# Patient Record
Sex: FEMALE | Race: WHITE | NOT HISPANIC OR LATINO | Employment: UNEMPLOYED | URBAN - METROPOLITAN AREA
[De-identification: names, ages, dates, MRNs, and addresses within clinical notes are randomized per-mention and may not be internally consistent; named-entity substitution may affect disease eponyms.]

---

## 2018-06-13 ENCOUNTER — OFFICE VISIT (OUTPATIENT)
Dept: OBSTETRICS AND GYNECOLOGY | Facility: CLINIC | Age: 23
End: 2018-06-13
Payer: COMMERCIAL

## 2018-06-13 VITALS
BODY MASS INDEX: 19.73 KG/M2 | DIASTOLIC BLOOD PRESSURE: 72 MMHG | WEIGHT: 133.19 LBS | HEIGHT: 69 IN | SYSTOLIC BLOOD PRESSURE: 100 MMHG

## 2018-06-13 DIAGNOSIS — Z30.9 ENCOUNTER FOR CONTRACEPTIVE MANAGEMENT, UNSPECIFIED TYPE: ICD-10-CM

## 2018-06-13 DIAGNOSIS — Z11.3 SCREEN FOR STD (SEXUALLY TRANSMITTED DISEASE): ICD-10-CM

## 2018-06-13 DIAGNOSIS — Z01.419 WOMEN'S ANNUAL ROUTINE GYNECOLOGICAL EXAMINATION: Primary | ICD-10-CM

## 2018-06-13 DIAGNOSIS — F32.81 PMDD (PREMENSTRUAL DYSPHORIC DISORDER): ICD-10-CM

## 2018-06-13 LAB
B-HCG UR QL: NEGATIVE
CTP QC/QA: YES

## 2018-06-13 PROCEDURE — 99385 PREV VISIT NEW AGE 18-39: CPT | Mod: S$GLB,,, | Performed by: NURSE PRACTITIONER

## 2018-06-13 PROCEDURE — 87491 CHLMYD TRACH DNA AMP PROBE: CPT

## 2018-06-13 PROCEDURE — 81025 URINE PREGNANCY TEST: CPT | Mod: S$GLB,,, | Performed by: NURSE PRACTITIONER

## 2018-06-13 PROCEDURE — 87510 GARDNER VAG DNA DIR PROBE: CPT

## 2018-06-13 PROCEDURE — 87480 CANDIDA DNA DIR PROBE: CPT

## 2018-06-13 PROCEDURE — 99999 PR PBB SHADOW E&M-NEW PATIENT-LVL III: CPT | Mod: PBBFAC,,, | Performed by: NURSE PRACTITIONER

## 2018-06-13 RX ORDER — DROSPIRENONE AND ETHINYL ESTRADIOL 0.02-3(28)
1 KIT ORAL DAILY
Qty: 90 TABLET | Refills: 3 | Status: SHIPPED | OUTPATIENT
Start: 2018-06-13 | End: 2018-07-26 | Stop reason: SDUPTHER

## 2018-06-13 RX ORDER — SERTRALINE HYDROCHLORIDE 50 MG/1
TABLET, FILM COATED ORAL
COMMUNITY
Start: 2018-04-30 | End: 2023-11-07

## 2018-06-13 RX ORDER — DROSPIRENONE, ETHINYL ESTRADIOL AND LEVOMEFOLATE CALCIUM AND LEVOMEFOLATE CALCIUM 3-0.02(24)
3 KIT ORAL DAILY
Qty: 84 TABLET | Refills: 3 | Status: SHIPPED | OUTPATIENT
Start: 2018-06-13 | End: 2020-06-25

## 2018-06-13 NOTE — PROGRESS NOTES
"CC: Annual  HPI: Pt "SASCHA"  is a 22 y.o.  female who presents for routine annual exam. 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  She uses condoms for contraception- desires to restart OCPs.  She is a non- smoker.  Denies history of HTN, blood clots, or stroke.  She does want STD screening- vaginal cultures.  Pt reports depression the week prior to her cycle- reports the depression resolves with onset of menses. She is s/p the HPV vaccine series.     ROS:  GENERAL: Feeling well overall. Denies fever or chills.   SKIN: Denies rash or lesions.   HEAD: Denies head injury or headache.   NODES: Denies enlarged lymph nodes.   CHEST: Denies chest pain or shortness of breath.   CARDIOVASCULAR: Denies palpitations or left sided chest pain.   ABDOMEN: No abdominal pain, constipation, diarrhea, nausea, vomiting or rectal bleeding.   URINARY: No dysuria, hematuria, or burning on urination.  REPRODUCTIVE: See HPI.   BREASTS: Denies pain, lumps, or nipple discharge.   HEMATOLOGIC: No easy bruisability or excessive bleeding.   MUSCULOSKELETAL: Denies joint pain or swelling.   NEUROLOGIC: Denies syncope or weakness.   PSYCHIATRIC: Denies depression, anxiety or mood swings.    PE:   APPEARANCE: Well nourished, well developed, White female in no acute distress.  NODES: no cervical, supraclavicular, or inguinal lymphadenopathy  BREASTS: Symmetrical, no skin changes or visible lesions. No palpable masses, nipple discharge or adenopathy bilaterally.  ABDOMEN: Soft. No tenderness or masses. No distention. No hernias palpated. No CVA tenderness.  VULVA: No lesions. Normal external female genitalia.  URETHRAL MEATUS: Normal size and location, no lesions, no prolapse.  URETHRA: No masses, tenderness, or prolapse.  VAGINA: Moist. No lesions or lacerations noted. No abnormal discharge present. No odor present.   CERVIX: No lesions or discharge. No cervical motion tenderness.   UTERUS: Normal size, regular shape, mobile, non-tender.  ADNEXA: No " tenderness. No fullness or masses palpated in the adnexal regions.   ANUS PERINEUM: Normal.      Diagnosis:  1. Women's annual routine gynecological examination    2. Encounter for contraceptive management, unspecified type    3. PMDD (premenstrual dysphoric disorder)    4. Screen for STD (sexually transmitted disease)        Plan:   Pap not indicated < 21 y.o.  STD screening  OCPs  Discussed PMDD- can trial continuous dosing OCPs if no improvement in mood with OCPs.   The use of the oral contraceptive has been fully discussed with the patient. This includes the proper method to initiate and continue the pills, the need for regular compliance to ensure adequate contraceptive effect, the physiology which make the pill effective, the instructions for what to do in event of a missed pill, and warnings about anticipated minor side effects such as breakthrough spotting, nausea, breast tenderness, weight changes, acne, headaches, etc.  She has been told of the more serious potential side effects such as MI, stroke, and deep vein thrombosis, all of which are very unlikely.  She has been asked to report any signs of such serious problems immediately.  She should back up the pill with a condom during any cycle in which antibiotics are prescribed, and during the first cycle as well. The need for additional protection, such as a condom, to prevent exposure to sexually transmitted diseases has also been discussed- the patient has been clearly reminded that OCP's cannot protect her against diseases such as HIV and others. She understands and wishes to take the medication as prescribed.       Orders Placed This Encounter    C. trachomatis/N. gonorrhoeae by AMP DNA Cervix    Vaginosis Screen by DNA Probe    POCT Urine Pregnancy    drospirenone-e.estradiol-lm.FA 3-0.02-0.451 mg (24) (4) Tab       Patient was counseled today on the new ACS guidelines for cervical cytology screening as well as the current recommendations for  breast cancer screening. She was counseled to follow up with her PCP for other routine health maintenance. Counseling session lasted approximately 10 minutes, and all her questions were answered.    Follow-up with me in 1 year for routine exam    CHERIE Cedeño

## 2018-06-14 LAB
CANDIDA RRNA VAG QL PROBE: NEGATIVE
G VAGINALIS RRNA GENITAL QL PROBE: NEGATIVE
T VAGINALIS RRNA GENITAL QL PROBE: NEGATIVE

## 2018-06-15 LAB
C TRACH DNA SPEC QL NAA+PROBE: NOT DETECTED
N GONORRHOEA DNA SPEC QL NAA+PROBE: NOT DETECTED

## 2018-07-26 RX ORDER — DROSPIRENONE AND ETHINYL ESTRADIOL TABLETS 0.02-3(28)
KIT ORAL
Qty: 84 TABLET | Refills: 0 | Status: SHIPPED | OUTPATIENT
Start: 2018-07-26 | End: 2018-12-18 | Stop reason: SDUPTHER

## 2018-08-07 ENCOUNTER — EMERGENCY (EMERGENCY)
Facility: HOSPITAL | Age: 23
LOS: 1 days | Discharge: HOME | End: 2018-08-07
Attending: EMERGENCY MEDICINE
Payer: COMMERCIAL

## 2018-08-07 VITALS
SYSTOLIC BLOOD PRESSURE: 103 MMHG | RESPIRATION RATE: 18 BRPM | WEIGHT: 134.92 LBS | DIASTOLIC BLOOD PRESSURE: 57 MMHG | HEART RATE: 85 BPM | TEMPERATURE: 100 F | OXYGEN SATURATION: 97 %

## 2018-08-07 LAB — GAS PNL BLDV: SIGNIFICANT CHANGE UP

## 2018-08-07 PROCEDURE — 71046 X-RAY EXAM CHEST 2 VIEWS: CPT | Mod: 26

## 2018-08-07 PROCEDURE — 99284 EMERGENCY DEPT VISIT MOD MDM: CPT

## 2018-08-07 RX ORDER — SODIUM CHLORIDE 9 MG/ML
1000 INJECTION INTRAMUSCULAR; INTRAVENOUS; SUBCUTANEOUS ONCE
Qty: 0 | Refills: 0 | Status: COMPLETED | OUTPATIENT
Start: 2018-08-07 | End: 2018-08-07

## 2018-08-07 RX ORDER — ACETAMINOPHEN 500 MG
650 TABLET ORAL ONCE
Qty: 0 | Refills: 0 | Status: COMPLETED | OUTPATIENT
Start: 2018-08-07 | End: 2018-08-07

## 2018-08-07 RX ORDER — IBUPROFEN 200 MG
600 TABLET ORAL ONCE
Qty: 0 | Refills: 0 | Status: COMPLETED | OUTPATIENT
Start: 2018-08-07 | End: 2018-08-07

## 2018-08-07 RX ADMIN — Medication 600 MILLIGRAM(S): at 23:31

## 2018-08-07 RX ADMIN — SODIUM CHLORIDE 1000 MILLILITER(S): 9 INJECTION INTRAMUSCULAR; INTRAVENOUS; SUBCUTANEOUS at 23:31

## 2018-08-07 NOTE — ED PROVIDER NOTE - PHYSICAL EXAMINATION
Gen: NAD  Head: NCAT  Lung: CTAB, no respiratory distress, no wheezing, rales, rhonchi  CV: normal s1/s2, rrr, no murmurs, Normal perfusion  Abd: soft, NTND, +L CVA tenderness  MSK: No edema, no visible deformities, full range of motion in all 4 extremities  Neuro: No nuchal rigidity, No focal neurologic deficits  Skin: No rash   Psych: normal affect

## 2018-08-07 NOTE — ED PROVIDER NOTE - PLAN OF CARE
1. Follow up with your primary care physician within 2-3days for reevaluation.  2.  Return to the Emergency Department for worsening, progressive or any other concerning symptoms.   3.  Please take Motrin 600mg by mouth every 6 hours as needed for pain. Please take this medication with food.   4.  Take antibiotics as prescribed.

## 2018-08-07 NOTE — ED PROVIDER NOTE - CARE PLAN
Principal Discharge DX:	Pyelonephritis  Assessment and plan of treatment:	1. Follow up with your primary care physician within 2-3days for reevaluation.  2.  Return to the Emergency Department for worsening, progressive or any other concerning symptoms.   3.  Please take Motrin 600mg by mouth every 6 hours as needed for pain. Please take this medication with food.   4.  Take antibiotics as prescribed.
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
## 2018-08-07 NOTE — ED PROVIDER NOTE - MEDICAL DECISION MAKING DETAILS
21yo female with fever, chills, left CVA tenderness with urinary symptoms, most likely pyelonephritis, less likely meningismus with no nuchal rigidity and alternative source, will check labs, ua, give fluids, motrin, reassess. Yamile Rivers DO
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
## 2018-08-08 LAB
ALBUMIN SERPL ELPH-MCNC: 4.2 G/DL — SIGNIFICANT CHANGE UP (ref 3.3–5)
ALP SERPL-CCNC: 46 U/L — SIGNIFICANT CHANGE UP (ref 40–120)
ALT FLD-CCNC: 8 U/L — LOW (ref 10–45)
ANION GAP SERPL CALC-SCNC: 16 MMOL/L — SIGNIFICANT CHANGE UP (ref 5–17)
APPEARANCE UR: ABNORMAL
AST SERPL-CCNC: 20 U/L — SIGNIFICANT CHANGE UP (ref 10–40)
BACTERIA # UR AUTO: ABNORMAL /HPF
BASOPHILS # BLD AUTO: 0 K/UL — SIGNIFICANT CHANGE UP (ref 0–0.2)
BASOPHILS NFR BLD AUTO: 0.2 % — SIGNIFICANT CHANGE UP (ref 0–2)
BILIRUB SERPL-MCNC: 0.2 MG/DL — SIGNIFICANT CHANGE UP (ref 0.2–1.2)
BILIRUB UR-MCNC: NEGATIVE — SIGNIFICANT CHANGE UP
BUN SERPL-MCNC: 8 MG/DL — SIGNIFICANT CHANGE UP (ref 7–23)
CALCIUM SERPL-MCNC: 9 MG/DL — SIGNIFICANT CHANGE UP (ref 8.4–10.5)
CHLORIDE SERPL-SCNC: 95 MMOL/L — LOW (ref 96–108)
CO2 SERPL-SCNC: 21 MMOL/L — LOW (ref 22–31)
COLOR SPEC: SIGNIFICANT CHANGE UP
CREAT SERPL-MCNC: 0.83 MG/DL — SIGNIFICANT CHANGE UP (ref 0.5–1.3)
DIFF PNL FLD: NEGATIVE — SIGNIFICANT CHANGE UP
EOSINOPHIL # BLD AUTO: 0 K/UL — SIGNIFICANT CHANGE UP (ref 0–0.5)
EOSINOPHIL NFR BLD AUTO: 0.1 % — SIGNIFICANT CHANGE UP (ref 0–6)
EPI CELLS # UR: SIGNIFICANT CHANGE UP /HPF
GLUCOSE SERPL-MCNC: 126 MG/DL — HIGH (ref 70–99)
GLUCOSE UR QL: NEGATIVE — SIGNIFICANT CHANGE UP
HCG UR QL: NEGATIVE — SIGNIFICANT CHANGE UP
HCT VFR BLD CALC: 35.8 % — SIGNIFICANT CHANGE UP (ref 34.5–45)
HGB BLD-MCNC: 11.9 G/DL — SIGNIFICANT CHANGE UP (ref 11.5–15.5)
KETONES UR-MCNC: NEGATIVE — SIGNIFICANT CHANGE UP
LEUKOCYTE ESTERASE UR-ACNC: ABNORMAL
LYMPHOCYTES # BLD AUTO: 0.9 K/UL — LOW (ref 1–3.3)
LYMPHOCYTES # BLD AUTO: 6.6 % — LOW (ref 13–44)
MCHC RBC-ENTMCNC: 30 PG — SIGNIFICANT CHANGE UP (ref 27–34)
MCHC RBC-ENTMCNC: 33.2 GM/DL — SIGNIFICANT CHANGE UP (ref 32–36)
MCV RBC AUTO: 90.5 FL — SIGNIFICANT CHANGE UP (ref 80–100)
MONOCYTES # BLD AUTO: 1.1 K/UL — HIGH (ref 0–0.9)
MONOCYTES NFR BLD AUTO: 8.4 % — SIGNIFICANT CHANGE UP (ref 2–14)
NEUTROPHILS # BLD AUTO: 11.5 K/UL — HIGH (ref 1.8–7.4)
NEUTROPHILS NFR BLD AUTO: 84.7 % — HIGH (ref 43–77)
NITRITE UR-MCNC: NEGATIVE — SIGNIFICANT CHANGE UP
PH UR: 6.5 — SIGNIFICANT CHANGE UP (ref 5–8)
PLATELET # BLD AUTO: 194 K/UL — SIGNIFICANT CHANGE UP (ref 150–400)
POTASSIUM SERPL-MCNC: 3.6 MMOL/L — SIGNIFICANT CHANGE UP (ref 3.5–5.3)
POTASSIUM SERPL-SCNC: 3.6 MMOL/L — SIGNIFICANT CHANGE UP (ref 3.5–5.3)
PROT SERPL-MCNC: 7.5 G/DL — SIGNIFICANT CHANGE UP (ref 6–8.3)
PROT UR-MCNC: SIGNIFICANT CHANGE UP
RBC # BLD: 3.95 M/UL — SIGNIFICANT CHANGE UP (ref 3.8–5.2)
RBC # FLD: 13.2 % — SIGNIFICANT CHANGE UP (ref 10.3–14.5)
RBC CASTS # UR COMP ASSIST: SIGNIFICANT CHANGE UP /HPF (ref 0–2)
SODIUM SERPL-SCNC: 132 MMOL/L — LOW (ref 135–145)
SP GR SPEC: 1.01 — SIGNIFICANT CHANGE UP (ref 1.01–1.02)
UROBILINOGEN FLD QL: NEGATIVE — SIGNIFICANT CHANGE UP
WBC # BLD: 13.6 K/UL — HIGH (ref 3.8–10.5)
WBC # FLD AUTO: 13.6 K/UL — HIGH (ref 3.8–10.5)
WBC UR QL: SIGNIFICANT CHANGE UP /HPF (ref 0–5)

## 2018-08-08 PROCEDURE — 85014 HEMATOCRIT: CPT

## 2018-08-08 PROCEDURE — 87186 SC STD MICRODIL/AGAR DIL: CPT

## 2018-08-08 PROCEDURE — 82435 ASSAY OF BLOOD CHLORIDE: CPT

## 2018-08-08 PROCEDURE — 87040 BLOOD CULTURE FOR BACTERIA: CPT

## 2018-08-08 PROCEDURE — 80053 COMPREHEN METABOLIC PANEL: CPT

## 2018-08-08 PROCEDURE — 83605 ASSAY OF LACTIC ACID: CPT

## 2018-08-08 PROCEDURE — 82803 BLOOD GASES ANY COMBINATION: CPT

## 2018-08-08 PROCEDURE — 71046 X-RAY EXAM CHEST 2 VIEWS: CPT

## 2018-08-08 PROCEDURE — 82565 ASSAY OF CREATININE: CPT

## 2018-08-08 PROCEDURE — 81001 URINALYSIS AUTO W/SCOPE: CPT

## 2018-08-08 PROCEDURE — 96374 THER/PROPH/DIAG INJ IV PUSH: CPT

## 2018-08-08 PROCEDURE — 82330 ASSAY OF CALCIUM: CPT

## 2018-08-08 PROCEDURE — 82947 ASSAY GLUCOSE BLOOD QUANT: CPT

## 2018-08-08 PROCEDURE — 99284 EMERGENCY DEPT VISIT MOD MDM: CPT | Mod: 25

## 2018-08-08 PROCEDURE — 81025 URINE PREGNANCY TEST: CPT

## 2018-08-08 PROCEDURE — 84295 ASSAY OF SERUM SODIUM: CPT

## 2018-08-08 PROCEDURE — 85027 COMPLETE CBC AUTOMATED: CPT

## 2018-08-08 PROCEDURE — 84132 ASSAY OF SERUM POTASSIUM: CPT

## 2018-08-08 PROCEDURE — 87086 URINE CULTURE/COLONY COUNT: CPT

## 2018-08-08 PROCEDURE — 96375 TX/PRO/DX INJ NEW DRUG ADDON: CPT

## 2018-08-08 RX ORDER — CEPHALEXIN 500 MG
1 CAPSULE ORAL
Qty: 20 | Refills: 0 | OUTPATIENT
Start: 2018-08-08 | End: 2018-08-17

## 2018-08-08 RX ORDER — CEFTRIAXONE 500 MG/1
1 INJECTION, POWDER, FOR SOLUTION INTRAMUSCULAR; INTRAVENOUS ONCE
Qty: 0 | Refills: 0 | Status: COMPLETED | OUTPATIENT
Start: 2018-08-08 | End: 2018-08-08

## 2018-08-08 RX ADMIN — CEFTRIAXONE 100 GRAM(S): 500 INJECTION, POWDER, FOR SOLUTION INTRAMUSCULAR; INTRAVENOUS at 00:39

## 2018-08-09 ENCOUNTER — EMERGENCY (EMERGENCY)
Facility: HOSPITAL | Age: 23
LOS: 1 days | Discharge: HOME | End: 2018-08-09
Attending: EMERGENCY MEDICINE
Payer: COMMERCIAL

## 2018-08-09 VITALS
HEART RATE: 67 BPM | RESPIRATION RATE: 18 BRPM | DIASTOLIC BLOOD PRESSURE: 67 MMHG | OXYGEN SATURATION: 99 % | TEMPERATURE: 99 F | SYSTOLIC BLOOD PRESSURE: 116 MMHG

## 2018-08-09 VITALS
HEART RATE: 74 BPM | DIASTOLIC BLOOD PRESSURE: 63 MMHG | HEIGHT: 69 IN | TEMPERATURE: 100 F | OXYGEN SATURATION: 99 % | WEIGHT: 134.92 LBS | SYSTOLIC BLOOD PRESSURE: 101 MMHG | RESPIRATION RATE: 18 BRPM

## 2018-08-09 LAB
ALBUMIN SERPL ELPH-MCNC: 4 G/DL — SIGNIFICANT CHANGE UP (ref 3.3–5)
ALP SERPL-CCNC: 60 U/L — SIGNIFICANT CHANGE UP (ref 40–120)
ALT FLD-CCNC: 9 U/L — LOW (ref 10–45)
ANION GAP SERPL CALC-SCNC: 16 MMOL/L — SIGNIFICANT CHANGE UP (ref 5–17)
APPEARANCE CSF: CLEAR — SIGNIFICANT CHANGE UP
APPEARANCE SPUN FLD: COLORLESS — SIGNIFICANT CHANGE UP
APPEARANCE UR: CLEAR — SIGNIFICANT CHANGE UP
APTT BLD: 27.7 SEC — SIGNIFICANT CHANGE UP (ref 27.5–37.4)
AST SERPL-CCNC: 15 U/L — SIGNIFICANT CHANGE UP (ref 10–40)
BASOPHILS # BLD AUTO: 0 K/UL — SIGNIFICANT CHANGE UP (ref 0–0.2)
BASOPHILS NFR BLD AUTO: 0.2 % — SIGNIFICANT CHANGE UP (ref 0–2)
BILIRUB SERPL-MCNC: 0.1 MG/DL — LOW (ref 0.2–1.2)
BILIRUB UR-MCNC: NEGATIVE — SIGNIFICANT CHANGE UP
BUN SERPL-MCNC: 6 MG/DL — LOW (ref 7–23)
CALCIUM SERPL-MCNC: 9.6 MG/DL — SIGNIFICANT CHANGE UP (ref 8.4–10.5)
CHLORIDE SERPL-SCNC: 96 MMOL/L — SIGNIFICANT CHANGE UP (ref 96–108)
CO2 SERPL-SCNC: 22 MMOL/L — SIGNIFICANT CHANGE UP (ref 22–31)
COLOR CSF: SIGNIFICANT CHANGE UP
COLOR SPEC: SIGNIFICANT CHANGE UP
CREAT SERPL-MCNC: 0.75 MG/DL — SIGNIFICANT CHANGE UP (ref 0.5–1.3)
DIFF PNL FLD: NEGATIVE — SIGNIFICANT CHANGE UP
EOSINOPHIL # BLD AUTO: 0.1 K/UL — SIGNIFICANT CHANGE UP (ref 0–0.5)
EOSINOPHIL NFR BLD AUTO: 0.7 % — SIGNIFICANT CHANGE UP (ref 0–6)
GAS PNL BLDV: SIGNIFICANT CHANGE UP
GLUCOSE CSF-MCNC: 59 MG/DL — SIGNIFICANT CHANGE UP (ref 40–70)
GLUCOSE SERPL-MCNC: 92 MG/DL — SIGNIFICANT CHANGE UP (ref 70–99)
GLUCOSE UR QL: NEGATIVE — SIGNIFICANT CHANGE UP
GRAM STN FLD: SIGNIFICANT CHANGE UP
HCT VFR BLD CALC: 37.7 % — SIGNIFICANT CHANGE UP (ref 34.5–45)
HGB BLD-MCNC: 12.1 G/DL — SIGNIFICANT CHANGE UP (ref 11.5–15.5)
INR BLD: 1.05 RATIO — SIGNIFICANT CHANGE UP (ref 0.88–1.16)
KETONES UR-MCNC: NEGATIVE — SIGNIFICANT CHANGE UP
LEUKOCYTE ESTERASE UR-ACNC: ABNORMAL
LYMPHOCYTES # BLD AUTO: 1.5 K/UL — SIGNIFICANT CHANGE UP (ref 1–3.3)
LYMPHOCYTES # BLD AUTO: 11.9 % — LOW (ref 13–44)
LYMPHOCYTES # CSF: 50 % — SIGNIFICANT CHANGE UP (ref 40–80)
MCHC RBC-ENTMCNC: 29.5 PG — SIGNIFICANT CHANGE UP (ref 27–34)
MCHC RBC-ENTMCNC: 32.2 GM/DL — SIGNIFICANT CHANGE UP (ref 32–36)
MCV RBC AUTO: 91.5 FL — SIGNIFICANT CHANGE UP (ref 80–100)
MONOCYTES # BLD AUTO: 1.2 K/UL — HIGH (ref 0–0.9)
MONOCYTES NFR BLD AUTO: 9.9 % — SIGNIFICANT CHANGE UP (ref 2–14)
MONOS+MACROS NFR CSF: 33 % — SIGNIFICANT CHANGE UP (ref 15–45)
NEUTROPHILS # BLD AUTO: 9.5 K/UL — HIGH (ref 1.8–7.4)
NEUTROPHILS # CSF: 17 % — HIGH (ref 0–6)
NEUTROPHILS NFR BLD AUTO: 77.3 % — HIGH (ref 43–77)
NITRITE UR-MCNC: NEGATIVE — SIGNIFICANT CHANGE UP
NRBC NFR CSF: 2 /UL — SIGNIFICANT CHANGE UP (ref 0–5)
PH UR: 7 — SIGNIFICANT CHANGE UP (ref 5–8)
PLATELET # BLD AUTO: 235 K/UL — SIGNIFICANT CHANGE UP (ref 150–400)
POTASSIUM SERPL-MCNC: 3.8 MMOL/L — SIGNIFICANT CHANGE UP (ref 3.5–5.3)
POTASSIUM SERPL-SCNC: 3.8 MMOL/L — SIGNIFICANT CHANGE UP (ref 3.5–5.3)
PROT CSF-MCNC: 22 MG/DL — SIGNIFICANT CHANGE UP (ref 15–45)
PROT SERPL-MCNC: 8 G/DL — SIGNIFICANT CHANGE UP (ref 6–8.3)
PROT UR-MCNC: NEGATIVE — SIGNIFICANT CHANGE UP
PROTHROM AB SERPL-ACNC: 11.4 SEC — SIGNIFICANT CHANGE UP (ref 9.8–12.7)
RAPID RVP RESULT: SIGNIFICANT CHANGE UP
RBC # BLD: 4.12 M/UL — SIGNIFICANT CHANGE UP (ref 3.8–5.2)
RBC # CSF: 24 /UL — HIGH (ref 0–0)
RBC # FLD: 13.6 % — SIGNIFICANT CHANGE UP (ref 10.3–14.5)
SODIUM SERPL-SCNC: 134 MMOL/L — LOW (ref 135–145)
SP GR SPEC: 1.01 — LOW (ref 1.01–1.02)
SPECIMEN SOURCE: SIGNIFICANT CHANGE UP
TUBE TYPE: SIGNIFICANT CHANGE UP
UROBILINOGEN FLD QL: NEGATIVE — SIGNIFICANT CHANGE UP
WBC # BLD: 12.3 K/UL — HIGH (ref 3.8–10.5)
WBC # FLD AUTO: 12.3 K/UL — HIGH (ref 3.8–10.5)

## 2018-08-09 PROCEDURE — 85027 COMPLETE CBC AUTOMATED: CPT

## 2018-08-09 PROCEDURE — 87070 CULTURE OTHR SPECIMN AEROBIC: CPT

## 2018-08-09 PROCEDURE — 99284 EMERGENCY DEPT VISIT MOD MDM: CPT | Mod: 25

## 2018-08-09 PROCEDURE — 84295 ASSAY OF SERUM SODIUM: CPT

## 2018-08-09 PROCEDURE — 87040 BLOOD CULTURE FOR BACTERIA: CPT

## 2018-08-09 PROCEDURE — 84157 ASSAY OF PROTEIN OTHER: CPT

## 2018-08-09 PROCEDURE — 62270 DX LMBR SPI PNXR: CPT

## 2018-08-09 PROCEDURE — 84132 ASSAY OF SERUM POTASSIUM: CPT

## 2018-08-09 PROCEDURE — 82947 ASSAY GLUCOSE BLOOD QUANT: CPT

## 2018-08-09 PROCEDURE — 87086 URINE CULTURE/COLONY COUNT: CPT

## 2018-08-09 PROCEDURE — 80053 COMPREHEN METABOLIC PANEL: CPT

## 2018-08-09 PROCEDURE — 85610 PROTHROMBIN TIME: CPT

## 2018-08-09 PROCEDURE — 87633 RESP VIRUS 12-25 TARGETS: CPT

## 2018-08-09 PROCEDURE — 99285 EMERGENCY DEPT VISIT HI MDM: CPT | Mod: 25

## 2018-08-09 PROCEDURE — 87529 HSV DNA AMP PROBE: CPT

## 2018-08-09 PROCEDURE — 87486 CHLMYD PNEUM DNA AMP PROBE: CPT

## 2018-08-09 PROCEDURE — 85730 THROMBOPLASTIN TIME PARTIAL: CPT

## 2018-08-09 PROCEDURE — 81001 URINALYSIS AUTO W/SCOPE: CPT

## 2018-08-09 PROCEDURE — 83615 LACTATE (LD) (LDH) ENZYME: CPT

## 2018-08-09 PROCEDURE — 87798 DETECT AGENT NOS DNA AMP: CPT

## 2018-08-09 PROCEDURE — 87205 SMEAR GRAM STAIN: CPT

## 2018-08-09 PROCEDURE — 82945 GLUCOSE OTHER FLUID: CPT

## 2018-08-09 PROCEDURE — 82435 ASSAY OF BLOOD CHLORIDE: CPT

## 2018-08-09 PROCEDURE — 96374 THER/PROPH/DIAG INJ IV PUSH: CPT | Mod: XU

## 2018-08-09 PROCEDURE — 82803 BLOOD GASES ANY COMBINATION: CPT

## 2018-08-09 PROCEDURE — 89051 BODY FLUID CELL COUNT: CPT

## 2018-08-09 PROCEDURE — 85014 HEMATOCRIT: CPT

## 2018-08-09 PROCEDURE — 87483 CNS DNA AMP PROBE TYPE 12-25: CPT

## 2018-08-09 PROCEDURE — 82330 ASSAY OF CALCIUM: CPT

## 2018-08-09 PROCEDURE — 87581 M.PNEUMON DNA AMP PROBE: CPT

## 2018-08-09 PROCEDURE — 83605 ASSAY OF LACTIC ACID: CPT

## 2018-08-09 RX ORDER — CEPHALEXIN 500 MG
1 CAPSULE ORAL
Qty: 20 | Refills: 0 | OUTPATIENT
Start: 2018-08-09 | End: 2018-08-13

## 2018-08-09 RX ORDER — CEFTRIAXONE 500 MG/1
2 INJECTION, POWDER, FOR SOLUTION INTRAMUSCULAR; INTRAVENOUS ONCE
Qty: 0 | Refills: 0 | Status: DISCONTINUED | OUTPATIENT
Start: 2018-08-09 | End: 2018-08-13

## 2018-08-09 RX ORDER — ACETAMINOPHEN 500 MG
1000 TABLET ORAL ONCE
Qty: 0 | Refills: 0 | Status: COMPLETED | OUTPATIENT
Start: 2018-08-09 | End: 2018-08-09

## 2018-08-09 RX ORDER — SODIUM CHLORIDE 9 MG/ML
1000 INJECTION INTRAMUSCULAR; INTRAVENOUS; SUBCUTANEOUS ONCE
Qty: 0 | Refills: 0 | Status: COMPLETED | OUTPATIENT
Start: 2018-08-09 | End: 2018-08-09

## 2018-08-09 RX ADMIN — SODIUM CHLORIDE 1000 MILLILITER(S): 9 INJECTION INTRAMUSCULAR; INTRAVENOUS; SUBCUTANEOUS at 19:04

## 2018-08-09 RX ADMIN — Medication 400 MILLIGRAM(S): at 19:04
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
## 2018-08-09 NOTE — ED ADULT NURSE NOTE - NSIMPLEMENTINTERV_GEN_ALL_ED
Implemented All Universal Safety Interventions:  [place] to call system. Call bell, personal items and telephone within reach. Instruct patient to call for assistance. Room bathroom lighting operational. Non-slip footwear when patient is off stretcher. Physically safe environment: no spills, clutter or unnecessary equipment. Stretcher in lowest position, wheels locked, appropriate side rails in place.

## 2018-08-09 NOTE — ED ADULT NURSE REASSESSMENT NOTE - NS ED NURSE REASSESS COMMENT FT1
Report received from Paola [facility]. Patient had lumbar puncture performed by Catrachita MAO. Patient lying flat in stretcher bed at this time. Reports improvement in headache, denies shortness of breath, chest pain, no n/v/d. Skin is warm/dry. Will monitor and reassess. Family at bedside.

## 2018-08-09 NOTE — ED PROVIDER NOTE - NEUROLOGICAL, MLM
Alert and oriented, no focal deficits, no motor or sensory deficits. ROM of neck intact but has pain with movement

## 2018-08-09 NOTE — ED PROVIDER NOTE - PROGRESS NOTE DETAILS
Patient feeling better. CSF not c/w meningitis. Will change keflex to qid given we area treating for pyelo

## 2018-08-09 NOTE — ED PROVIDER NOTE - MEDICAL DECISION MAKING DETAILS
22 F with fever, headache and neck stiffness. Well appearing, has full ROm neck but with subjective stiffness. Also with L flank pain. Febrile in ED. Will obtain labs, pain control, cultures, LP, would consider US renal as well r/o hydro if UA positive 22 F with fever, headache and neck stiffness. Well appearing, has full ROm neck but with subjective stiffness. Also with L flank pain. Febrile in ED. Will obtain labs, pain control, cultures, LP, would consider US renal as well r/o hydro if UA positive  MD Lorrie,Attending: pt seen. agree with above HPI/ROS/PE. Pt is on po abx x 2 days for UTI. headache and neck discomfort with fever mandates workup to r/o partially treated bacterial meningitis. DDx includes ascending UTI, viral syndrome, ENT infection. For full labs, repeat UA and LP. sensitivities form prior E. coli positive urine culture not resulted as of now.  Rx with IV Rocephin for ? ascending UTI and early Rx for meningitis pending LP results. Analgese

## 2018-08-09 NOTE — ED ADULT TRIAGE NOTE - CHIEF COMPLAINT QUOTE
Pt was discharged a few days ago for kidney infection. Presents c/o headache, chills and pain down both legs.

## 2018-08-10 PROBLEM — S36.113A LACERATION OF LIVER, UNSPECIFIED DEGREE, INITIAL ENCOUNTER: Chronic | Status: ACTIVE | Noted: 2018-08-07

## 2018-08-10 LAB
-  AMIKACIN: SIGNIFICANT CHANGE UP
-  AMOXICILLIN/CLAVULANIC ACID: SIGNIFICANT CHANGE UP
-  AMPICILLIN/SULBACTAM: SIGNIFICANT CHANGE UP
-  AMPICILLIN: SIGNIFICANT CHANGE UP
-  AZTREONAM: SIGNIFICANT CHANGE UP
-  CEFAZOLIN: SIGNIFICANT CHANGE UP
-  CEFEPIME: SIGNIFICANT CHANGE UP
-  CEFOXITIN: SIGNIFICANT CHANGE UP
-  CEFTRIAXONE: SIGNIFICANT CHANGE UP
-  CIPROFLOXACIN: SIGNIFICANT CHANGE UP
-  ERTAPENEM: SIGNIFICANT CHANGE UP
-  GENTAMICIN: SIGNIFICANT CHANGE UP
-  IMIPENEM: SIGNIFICANT CHANGE UP
-  LEVOFLOXACIN: SIGNIFICANT CHANGE UP
-  MEROPENEM: SIGNIFICANT CHANGE UP
-  NITROFURANTOIN: SIGNIFICANT CHANGE UP
-  PIPERACILLIN/TAZOBACTAM: SIGNIFICANT CHANGE UP
-  TIGECYCLINE: SIGNIFICANT CHANGE UP
-  TOBRAMYCIN: SIGNIFICANT CHANGE UP
-  TRIMETHOPRIM/SULFAMETHOXAZOLE: SIGNIFICANT CHANGE UP
CSF PCR RESULT: SIGNIFICANT CHANGE UP
CULTURE RESULTS: NO GROWTH — SIGNIFICANT CHANGE UP
CULTURE RESULTS: SIGNIFICANT CHANGE UP
LABORATORY COMMENT REPORT: SIGNIFICANT CHANGE UP
METHOD TYPE: SIGNIFICANT CHANGE UP
ORGANISM # SPEC MICROSCOPIC CNT: SIGNIFICANT CHANGE UP
ORGANISM # SPEC MICROSCOPIC CNT: SIGNIFICANT CHANGE UP
SOURCE HSV 1/2: SIGNIFICANT CHANGE UP
SPECIMEN SOURCE: SIGNIFICANT CHANGE UP
SPECIMEN SOURCE: SIGNIFICANT CHANGE UP

## 2018-08-10 NOTE — ED POST DISCHARGE NOTE - RESULT SUMMARY
Prelim culture urine (+) E.coli > 100,000. Sensitivity to follow 08/10/18 Prelim culture urine (+) E.coli > 100,000. Sensitivity to follow - Prem MÉNDEZ 08/10/18 Prelim culture urine (+) E.coli > 100,000 CFU/ml. Sensitivity to santana - Prem MÉNDEZ

## 2018-08-12 LAB
CULTURE RESULTS: SIGNIFICANT CHANGE UP
SPECIMEN SOURCE: SIGNIFICANT CHANGE UP

## 2018-08-12 NOTE — ED POST DISCHARGE NOTE - DETAILS
Contacted patient's mother, she is concerned that her daughter has had persistent severes HAs worse with sitting or standing. She states that these HAs are exactly the same as the ones she presented with originally on 8/9/18, but feel more severe, no fever, chills, vomiting. Has been taking ibuprofen and tylenol without resolution. I told patient's mother that if these sxs are concerning she should return to ED, Patient's mother will direct daughter to return to ED. -Duglas Weller PA-C

## 2018-08-12 NOTE — ED POST DISCHARGE NOTE - REASON FOR FOLLOW-UP
Other Received a message from patient's mother, charito Alvarenga, on after care line requesting a call back.  Attempted to contact patient's mother at [phone].  Mailbox was full and I was unable to leave a voicemail.  Will reattempt.  -Ceferino Kumar PA-C

## 2018-08-13 LAB
CULTURE RESULTS: SIGNIFICANT CHANGE UP
CULTURE RESULTS: SIGNIFICANT CHANGE UP
SPECIMEN SOURCE: SIGNIFICANT CHANGE UP
SPECIMEN SOURCE: SIGNIFICANT CHANGE UP

## 2018-08-17 ENCOUNTER — EMERGENCY (EMERGENCY)
Facility: HOSPITAL | Age: 23
LOS: 1 days | Discharge: HOME | End: 2018-08-17
Attending: EMERGENCY MEDICINE
Payer: COMMERCIAL

## 2018-08-17 VITALS
HEART RATE: 56 BPM | OXYGEN SATURATION: 100 % | RESPIRATION RATE: 16 BRPM | DIASTOLIC BLOOD PRESSURE: 73 MMHG | SYSTOLIC BLOOD PRESSURE: 117 MMHG | TEMPERATURE: 99 F

## 2018-08-17 VITALS
RESPIRATION RATE: 17 BRPM | OXYGEN SATURATION: 99 % | HEIGHT: 69 IN | HEART RATE: 64 BPM | DIASTOLIC BLOOD PRESSURE: 57 MMHG | WEIGHT: 134.92 LBS | TEMPERATURE: 98 F | SYSTOLIC BLOOD PRESSURE: 120 MMHG

## 2018-08-17 LAB
ALBUMIN SERPL ELPH-MCNC: 3.9 G/DL — SIGNIFICANT CHANGE UP (ref 3.3–5)
ALP SERPL-CCNC: 45 U/L — SIGNIFICANT CHANGE UP (ref 40–120)
ALT FLD-CCNC: 10 U/L — SIGNIFICANT CHANGE UP (ref 10–45)
ANION GAP SERPL CALC-SCNC: 14 MMOL/L — SIGNIFICANT CHANGE UP (ref 5–17)
APPEARANCE UR: CLEAR — SIGNIFICANT CHANGE UP
APTT BLD: 29.8 SEC — SIGNIFICANT CHANGE UP (ref 27.5–37.4)
AST SERPL-CCNC: 18 U/L — SIGNIFICANT CHANGE UP (ref 10–40)
BASOPHILS # BLD AUTO: 0.1 K/UL — SIGNIFICANT CHANGE UP (ref 0–0.2)
BASOPHILS NFR BLD AUTO: 1.1 % — SIGNIFICANT CHANGE UP (ref 0–2)
BILIRUB SERPL-MCNC: <0.1 MG/DL — LOW (ref 0.2–1.2)
BILIRUB UR-MCNC: NEGATIVE — SIGNIFICANT CHANGE UP
BUN SERPL-MCNC: 10 MG/DL — SIGNIFICANT CHANGE UP (ref 7–23)
CALCIUM SERPL-MCNC: 9.6 MG/DL — SIGNIFICANT CHANGE UP (ref 8.4–10.5)
CHLORIDE SERPL-SCNC: 101 MMOL/L — SIGNIFICANT CHANGE UP (ref 96–108)
CO2 SERPL-SCNC: 25 MMOL/L — SIGNIFICANT CHANGE UP (ref 22–31)
COLOR SPEC: SIGNIFICANT CHANGE UP
CREAT SERPL-MCNC: 0.79 MG/DL — SIGNIFICANT CHANGE UP (ref 0.5–1.3)
DIFF PNL FLD: NEGATIVE — SIGNIFICANT CHANGE UP
EOSINOPHIL # BLD AUTO: 0.2 K/UL — SIGNIFICANT CHANGE UP (ref 0–0.5)
EOSINOPHIL NFR BLD AUTO: 3.6 % — SIGNIFICANT CHANGE UP (ref 0–6)
GLUCOSE SERPL-MCNC: 91 MG/DL — SIGNIFICANT CHANGE UP (ref 70–99)
GLUCOSE UR QL: NEGATIVE — SIGNIFICANT CHANGE UP
HCT VFR BLD CALC: 33.3 % — LOW (ref 34.5–45)
HGB BLD-MCNC: 11 G/DL — LOW (ref 11.5–15.5)
INR BLD: 0.99 RATIO — SIGNIFICANT CHANGE UP (ref 0.88–1.16)
KETONES UR-MCNC: NEGATIVE — SIGNIFICANT CHANGE UP
LEUKOCYTE ESTERASE UR-ACNC: NEGATIVE — SIGNIFICANT CHANGE UP
LYMPHOCYTES # BLD AUTO: 2.1 K/UL — SIGNIFICANT CHANGE UP (ref 1–3.3)
LYMPHOCYTES # BLD AUTO: 37.8 % — SIGNIFICANT CHANGE UP (ref 13–44)
MCHC RBC-ENTMCNC: 30.3 PG — SIGNIFICANT CHANGE UP (ref 27–34)
MCHC RBC-ENTMCNC: 33.2 GM/DL — SIGNIFICANT CHANGE UP (ref 32–36)
MCV RBC AUTO: 91.3 FL — SIGNIFICANT CHANGE UP (ref 80–100)
MONOCYTES # BLD AUTO: 0.4 K/UL — SIGNIFICANT CHANGE UP (ref 0–0.9)
MONOCYTES NFR BLD AUTO: 6.6 % — SIGNIFICANT CHANGE UP (ref 2–14)
NEUTROPHILS # BLD AUTO: 2.9 K/UL — SIGNIFICANT CHANGE UP (ref 1.8–7.4)
NEUTROPHILS NFR BLD AUTO: 51 % — SIGNIFICANT CHANGE UP (ref 43–77)
NITRITE UR-MCNC: NEGATIVE — SIGNIFICANT CHANGE UP
PH UR: 7.5 — SIGNIFICANT CHANGE UP (ref 5–8)
PLATELET # BLD AUTO: 397 K/UL — SIGNIFICANT CHANGE UP (ref 150–400)
POTASSIUM SERPL-MCNC: 4.1 MMOL/L — SIGNIFICANT CHANGE UP (ref 3.5–5.3)
POTASSIUM SERPL-SCNC: 4.1 MMOL/L — SIGNIFICANT CHANGE UP (ref 3.5–5.3)
PROT SERPL-MCNC: 7.3 G/DL — SIGNIFICANT CHANGE UP (ref 6–8.3)
PROT UR-MCNC: NEGATIVE — SIGNIFICANT CHANGE UP
PROTHROM AB SERPL-ACNC: 10.7 SEC — SIGNIFICANT CHANGE UP (ref 9.8–12.7)
RBC # BLD: 3.64 M/UL — LOW (ref 3.8–5.2)
RBC # FLD: 13.3 % — SIGNIFICANT CHANGE UP (ref 10.3–14.5)
SODIUM SERPL-SCNC: 140 MMOL/L — SIGNIFICANT CHANGE UP (ref 135–145)
SP GR SPEC: 1.01 — SIGNIFICANT CHANGE UP (ref 1.01–1.02)
UROBILINOGEN FLD QL: NEGATIVE — SIGNIFICANT CHANGE UP
WBC # BLD: 5.7 K/UL — SIGNIFICANT CHANGE UP (ref 3.8–10.5)
WBC # FLD AUTO: 5.7 K/UL — SIGNIFICANT CHANGE UP (ref 3.8–10.5)

## 2018-08-17 PROCEDURE — 80053 COMPREHEN METABOLIC PANEL: CPT

## 2018-08-17 PROCEDURE — 70450 CT HEAD/BRAIN W/O DYE: CPT | Mod: 26

## 2018-08-17 PROCEDURE — 85610 PROTHROMBIN TIME: CPT

## 2018-08-17 PROCEDURE — 87086 URINE CULTURE/COLONY COUNT: CPT

## 2018-08-17 PROCEDURE — 99284 EMERGENCY DEPT VISIT MOD MDM: CPT | Mod: 25

## 2018-08-17 PROCEDURE — 70450 CT HEAD/BRAIN W/O DYE: CPT

## 2018-08-17 PROCEDURE — 85730 THROMBOPLASTIN TIME PARTIAL: CPT

## 2018-08-17 PROCEDURE — 81003 URINALYSIS AUTO W/O SCOPE: CPT

## 2018-08-17 PROCEDURE — 87186 SC STD MICRODIL/AGAR DIL: CPT

## 2018-08-17 PROCEDURE — 96374 THER/PROPH/DIAG INJ IV PUSH: CPT

## 2018-08-17 PROCEDURE — 85027 COMPLETE CBC AUTOMATED: CPT

## 2018-08-17 PROCEDURE — 99284 EMERGENCY DEPT VISIT MOD MDM: CPT

## 2018-08-17 RX ORDER — SODIUM CHLORIDE 9 MG/ML
1000 INJECTION INTRAMUSCULAR; INTRAVENOUS; SUBCUTANEOUS ONCE
Qty: 0 | Refills: 0 | Status: COMPLETED | OUTPATIENT
Start: 2018-08-17 | End: 2018-08-17

## 2018-08-17 RX ORDER — METOCLOPRAMIDE HCL 10 MG
10 TABLET ORAL ONCE
Qty: 0 | Refills: 0 | Status: COMPLETED | OUTPATIENT
Start: 2018-08-17 | End: 2018-08-17

## 2018-08-17 RX ADMIN — Medication 10 MILLIGRAM(S): at 15:34

## 2018-08-17 RX ADMIN — SODIUM CHLORIDE 1000 MILLILITER(S): 9 INJECTION INTRAMUSCULAR; INTRAVENOUS; SUBCUTANEOUS at 15:34

## 2018-08-17 NOTE — ED ADULT NURSE NOTE - NSIMPLEMENTINTERV_GEN_ALL_ED
Implemented All Universal Safety Interventions:  [place] to call system. Call bell, personal items and telephone within reach. Instruct patient to call for assistance. Room bathroom lighting operational. Non-slip footwear when patient is off stretcher. Physically safe environment: no spills, clutter or unnecessary equipment. Stretcher in lowest position, wheels locked, appropriate side rails in place.

## 2018-08-17 NOTE — ED PROVIDER NOTE - MEDICAL DECISION MAKING DETAILS
Labs, CT head non-con, consult anesthesia for possible blood patch if normal CT head, IV fluid and reglan for headache in meantime

## 2018-08-17 NOTE — ED PROVIDER NOTE - ATTENDING CONTRIBUTION TO CARE
21 yo female, here 2x recently, once for pyelo, then again for severe HA, LP studies unremarkable, sent home, with persistent postural HA x 1 week.  Did have the headache pre-LP, but now worse.  No fevers.  No focal neuro deficits.  Will medicate, CT head, possibly consult anesthesia for blood patch.

## 2018-08-17 NOTE — ED PROVIDER NOTE - OBJECTIVE STATEMENT
22 female with PMH h/o depression, recent pyelonephritis on day 11 of keflex, presents to the ED for persistent headache x 1.5 weeks. Patient had LP performed in this ED last Thursday for headache, results were negative. Notes persistent headache that is unchanged from prior to the LP, now worsened with sitting up. Patient traveled back from [place] to been seen in the ED after her mother called in. States OTC analgesia does not alleviate her pain. Woke up once from sleep due to headache. States she feels nausea and photophobia when headache is severe but denies at this time. Headache is 5/10 at present, at worse is 10/10. A&O x3, GCS 15. Denies urinary symptoms but notes she has not had any urinary symptoms throughout this time despite having a UTI. Denies fevers since starting the antibiotic.

## 2018-08-17 NOTE — ED ADULT NURSE REASSESSMENT NOTE - NS ED NURSE REASSESS COMMENT FT1
Blood patch procedure completed by anesthesia. Patient resting comfortably in bed, no complaints at this time. VSS. Will continue to monitor.. q15 vitals for 1 hr

## 2018-08-21 RX ORDER — AZTREONAM 2 G
1 VIAL (EA) INJECTION
Qty: 10 | Refills: 0 | OUTPATIENT
Start: 2018-08-21 | End: 2018-08-25

## 2018-08-21 NOTE — ED POST DISCHARGE NOTE - DETAILS
advised will switch to bactrim, patient given phone number for medicine clinic for follow up. - Stephanie Helm PA-C

## 2018-10-08 NOTE — ED PROVIDER NOTE - NSCAREINITIATED _GEN_ER
Yamile Rivers(Resident) Xerosis Normal Treatment: I recommended application of Cetaphil or CeraVe numerous times a day going to bed to all dry areas.

## 2018-12-18 RX ORDER — DROSPIRENONE AND ETHINYL ESTRADIOL TABLETS 0.02-3(28)
KIT ORAL
Qty: 84 TABLET | Refills: 0 | Status: SHIPPED | OUTPATIENT
Start: 2018-12-18 | End: 2020-04-13

## 2019-03-14 RX ORDER — DROSPIRENONE AND ETHINYL ESTRADIOL TABLETS 0.02-3(28)
KIT ORAL
Qty: 84 TABLET | Refills: 0 | Status: SHIPPED | OUTPATIENT
Start: 2019-03-14 | End: 2019-06-14 | Stop reason: SDUPTHER

## 2019-06-14 RX ORDER — DROSPIRENONE AND ETHINYL ESTRADIOL 0.02-3(28)
KIT ORAL
Qty: 84 TABLET | Refills: 0 | Status: SHIPPED | OUTPATIENT
Start: 2019-06-14 | End: 2019-09-05 | Stop reason: SDUPTHER

## 2019-09-05 RX ORDER — DROSPIRENONE AND ETHINYL ESTRADIOL TABLETS 0.02-3(28)
KIT ORAL
Qty: 84 TABLET | Refills: 0 | Status: SHIPPED | OUTPATIENT
Start: 2019-09-05 | End: 2019-12-04 | Stop reason: SDUPTHER

## 2019-09-23 NOTE — ED ADULT NURSE NOTE - OBJECTIVE STATEMENT
SUBJECTIVE:  Jonatan Purcell is a 64 year old male who comes in today for a cystourethroscopy for gross hematuria.   IPSS - 14. QOL 3. PVR 59 ml.      OBJECTIVE:  Procedure:    Patient was placed in a supine position on the examination table. After routine prep, drape and local anesthesia, the flexible cystoscope was inserted into the bladder.  Residual urine was minimal.  The bladder mucosa was inspected throughout and was pink with no evidence of tumors, stones, or diverticula.  The ureteral orifices are normal in position and configuration.  The prostatic urethra is 2 cms in length with Severe lateral obstruction.  The anterior urethra is normal.      CT - 9/18/19  IMPRESSION:  1.  Small 2-3 mm low-grade obstructing calculus within the proximal left ureter.  2.  Small subcentimeter foci within both kidneys.  These are too small for characterization.  Most likely a benign cyst.  3.  Moderate prostatomegaly.  The urinary bladder is nondistended with nonspecific diffuse wall thickening.  Exclude urinary tract infection clinically.  4.  Mild fatty changes liver.  5.  Post gastric bypass surgical changes.                                                  PLAN:  Continue flomax 0.8 mg daily  Continue finasteride 5mg daily  Consider TURP in the future - shaving the prostate   Schedule: Cystoscopy, Left Ureteroscopy with laser lithotripsy, stone manipulation, and possible ipsilateral stent placement    We reviewed the treatment options which included medical expulsion therapy, shockwave lithotripsy, ureteroscopy with laser lithotripsy and stone manipulation and percutaneous nephrolithotomy. The risks and benefits of each procedure were discussed.    Risks and complications including but not limited to bleeding, infection, acute and chronic pain, injury/scarring to adjacent organs, ureteral injury or scar formation, ureteral stricture formation, ureteral avulsion, clot urinary retention, incontinence, bladder perforation,  failure or need for successive procedures, PE/DVT, MI/CVA and Mortality have been discussed with the patient.    Drink plenty of fluids  Take pain medications as needed  Continue the flomax    Location: [facility]  Anesthesia: Choice  Length: 1 hour  Medical Status: Patient will require medical clearance to be performed by PCP  Position: dorsal lithotomy.       Electronically signed by: Abhishek White DO  9/24/2019       22 year old female presents to ED c/o HA x 1 week s/p LP for HA 1 week ago and gets better lying flat and worse standing up. OTC medications don't help. Patient denies, fevers, nuchal rigidity. In no acute distress. Boyfriend at bedside.

## 2019-11-22 NOTE — ED ADULT NURSE NOTE - NSSISCREENINGQ4_ED_A_ED
Lavern Allen is an 78 year old female.  Patient presents with cough and congestion.  Patient states that she has been sick the last couple of days.  Has been having chills and feeling fatigued.  Patient states that she does have COPD.  Has been using her inhaler at home.  Shortness of breath with coughing fits.  Denies any chest pain.     Past Medical History:   Diagnosis Date   • Breast cancer (CMS/HCC)     Right breast status post lumpectomy followed by mastectomy   • Colonic polyp    • Disorder of bone and cartilage, unspecified 2010   • Dyslipidemia    • HTN (hypertension)    • Lower back pain    • Osteoporosis    • Renal insufficiency      Past Surgical History:   Procedure Laterality Date   • Ankle surgery      10 years ago   • Breast lumpectomy  2011   • Breast surgery      i&d ABSCESS   • Breast surgery  2012    MASTECTOMY R   • Colonoscopy diagnostic  2016    3 year   • Colonoscopy w/ biopsies and polypectomy  2008   • Dexa bone density axial skeleton  2010   • Laminec/facetect/foramin,lumbar  1986    Laminectomy, Lumbar   • Occult blood test tube  2011   • Pap smear,routine  2010   • Past surgical history      R ankle surgery for fx   • Past surgical history      breast abcess I&D   • Past surgical history      fibroid removed     Family History   Problem Relation Age of Onset   • Heart disease Mother    • Stroke Father    • Cancer Paternal Grandmother         UTERINE   • Diabetes Neg Hx      Social History     Tobacco Use   • Smoking status: Former Smoker     Packs/day: 2.00     Years: 50.00     Pack years: 100.00     Types: Cigarettes     Last attempt to quit: 2017     Years since quittin.0   • Smokeless tobacco: Never Used   • Tobacco comment: smk'd up to 2 ppd prior to quitting, per pt. she doesn't smoke as much as she used to she is trying to stop   Substance Use Topics   • Alcohol use: No     Alcohol/week: 0.0 standard drinks       Prior to  Admission Meds:(Not in a hospital admission)    Scheduled Meds:  Continuous Infusions:  PRN Meds:    Allergies:   ALLERGIES:   Allergen Reactions   • Allergy      Hair dye.    • Fosamax SWELLING     Ankle and facial swelling       Active Problems:    * No active hospital problems. *    Blood pressure 164/74, pulse 94, temperature 97.9 °F (36.6 °C), temperature source Oral, resp. rate 18, height 5' 2\" (1.575 m), weight 86.2 kg, SpO2 97 %.    Review of Systems   Constitutional: Positive for fatigue.   HENT: Positive for congestion.    Respiratory: Positive for cough and shortness of breath.    Cardiovascular: Negative.    Neurological: Negative.    Psychiatric/Behavioral: Negative.         Physical Exam   Constitutional: She is oriented to person, place, and time. She appears well-developed and well-nourished.   HENT:   Head: Normocephalic.   Right Ear: Hearing, tympanic membrane, external ear and ear canal normal.   Left Ear: Hearing, tympanic membrane, external ear and ear canal normal.   Nose: Nose normal. Right sinus exhibits no maxillary sinus tenderness and no frontal sinus tenderness. Left sinus exhibits no maxillary sinus tenderness and no frontal sinus tenderness.   Mouth/Throat: Uvula is midline, oropharynx is clear and moist and mucous membranes are normal.   Neck: Normal range of motion. Neck supple.   Cardiovascular: Normal rate, regular rhythm and normal heart sounds.   - edema   Pulmonary/Chest: Effort normal and breath sounds normal.   Coughing with deep breathing Musculoskeletal: Normal range of motion.     Neurological: She is alert and oriented to person, place, and time.   Skin: Skin is warm and dry.   Psychiatric: She has a normal mood and affect. Her behavior is normal. Judgment and thought content normal.       Assessment:  I have reviewed the past medial history, family history, social history, medications and allergies listed in the medical record as obtained by nursing staff and support staff  and agree with their documentation    MDM    Duoneb treatment  Post treatment patient feels better    HISTORY:  Cough.     COMPARISON:  6/7/2018..     FINDINGS:  PA and lateral chest. The heart size is at the upper limits of  normal. Pulmonary vascular markings are unchanged. I do not see an acute  infiltrate.        IMPRESSION:  I do not see any acute changes in the chest.         The patient is clinically stable and non toxic  The ddx is: R05 Cough  (primary encounter diagnosis)  J40 Bronchitis    As supported by diagnostic testing today and procedural interventions as appropriate.  The low likelihood of other less likely ddx is insufficient to justify any further testing for them.  Risk outweighs the benefit.  This was explained to the patient.  The patient was advised that persistent or worsening of symptoms would require further evaluation.    During the visit the patient remained: stable    OTC/Supportive     Bronchitis  - benzonatate (TESSALON PERLES) 200 MG capsule; Take 1 capsule by mouth 3 times daily as needed for Cough.  Dispense: 20 capsule; Refill: 0  - albuterol 108 (90 Base) MCG/ACT inhaler; Inhale 2 puffs into the lungs every 4 hours as needed for Shortness of Breath or Wheezing.  Dispense: 18 g; Refill: 0  - azithromycin (ZITHROMAX) 250 MG tablet; Take 2 tablets by mouth on day 1, Then take 1 tablet by mouth on days 2-5.  Dispense: 6 tablet; Refill: 0     -Take antibiotic as directed  -Inhaler as needed  -Tessalon for cough as needed  -Rest  -Increase fluids  -Add humidifier to room  -Follow up with primary care provider for any new or worsening concerns.     POLLO Saini  11/22/2019       Patient seen under the supervision of Dr. Childers    Significant portion of visit spent in discussion, patient education, including the likelihood of etiology of sn/sx, counseling about reasonable and safe management options, including potential rx, r/b/se, and coordination of care.  Shared decision making  practiced.  All questions addressed and answered.  If signs or symptoms worsen or your condition worsens you may require ED evaluation.  Patient verbalizes understanding of and agreement of plan as documented.      No

## 2019-12-04 RX ORDER — DROSPIRENONE AND ETHINYL ESTRADIOL TABLETS 0.02-3(28)
KIT ORAL
Qty: 84 TABLET | Refills: 0 | Status: SHIPPED | OUTPATIENT
Start: 2019-12-04 | End: 2020-03-02

## 2020-03-02 RX ORDER — DROSPIRENONE AND ETHINYL ESTRADIOL TABLETS 0.02-3(28)
KIT ORAL
Qty: 84 TABLET | Refills: 0 | Status: SHIPPED | OUTPATIENT
Start: 2020-03-02 | End: 2020-07-14 | Stop reason: SDUPTHER

## 2020-04-13 RX ORDER — DROSPIRENONE AND ETHINYL ESTRADIOL TABLETS 0.02-3(28)
KIT ORAL
Qty: 84 TABLET | Refills: 0 | Status: SHIPPED | OUTPATIENT
Start: 2020-04-13 | End: 2020-06-25

## 2020-06-25 ENCOUNTER — OFFICE VISIT (OUTPATIENT)
Dept: URGENT CARE | Facility: CLINIC | Age: 25
End: 2020-06-25
Payer: COMMERCIAL

## 2020-06-25 VITALS
HEIGHT: 69 IN | RESPIRATION RATE: 18 BRPM | OXYGEN SATURATION: 99 % | HEART RATE: 64 BPM | WEIGHT: 135 LBS | TEMPERATURE: 99 F | BODY MASS INDEX: 19.99 KG/M2

## 2020-06-25 DIAGNOSIS — R19.7 DIARRHEA, UNSPECIFIED TYPE: ICD-10-CM

## 2020-06-25 DIAGNOSIS — Z20.822 CLOSE EXPOSURE TO COVID-19 VIRUS: Primary | ICD-10-CM

## 2020-06-25 PROCEDURE — 99203 PR OFFICE/OUTPT VISIT, NEW, LEVL III, 30-44 MIN: ICD-10-PCS | Mod: S$GLB,,, | Performed by: NURSE PRACTITIONER

## 2020-06-25 PROCEDURE — U0003 INFECTIOUS AGENT DETECTION BY NUCLEIC ACID (DNA OR RNA); SEVERE ACUTE RESPIRATORY SYNDROME CORONAVIRUS 2 (SARS-COV-2) (CORONAVIRUS DISEASE [COVID-19]), AMPLIFIED PROBE TECHNIQUE, MAKING USE OF HIGH THROUGHPUT TECHNOLOGIES AS DESCRIBED BY CMS-2020-01-R: HCPCS

## 2020-06-25 PROCEDURE — 99203 OFFICE O/P NEW LOW 30 MIN: CPT | Mod: S$GLB,,, | Performed by: NURSE PRACTITIONER

## 2020-06-25 NOTE — PROGRESS NOTES
"Subjective:       Patient ID: Abimbola Saunedrs is a 24 y.o. female.    Vitals:  height is 5' 9" (1.753 m) and weight is 61.2 kg (135 lb). Her temperature is 98.6 °F (37 °C). Her pulse is 64. Her respiration is 18 and oxygen saturation is 99%.     Chief Complaint: COVID-19 Concerns    24 year old female c/o exposure to COVID as her parents and brother are ill and she lives with them.  She does not have any symptoms at the moment.  She states the diarrhea ended 2 days ago.       Constitution: Negative for chills, sweating, fatigue and fever.   HENT: Negative for ear pain, congestion, sinus pain, sinus pressure, sore throat and voice change.    Neck: Negative for painful lymph nodes.   Eyes: Negative for eye redness.   Respiratory: Negative for chest tightness, cough, sputum production, bloody sputum, COPD, shortness of breath, stridor, wheezing and asthma.    Gastrointestinal: Positive for diarrhea. Negative for nausea and vomiting.   Musculoskeletal: Negative for muscle ache.   Skin: Negative for rash.   Allergic/Immunologic: Negative for seasonal allergies and asthma.   Hematologic/Lymphatic: Negative for swollen lymph nodes.       Objective:      Physical Exam   Constitutional: She is oriented to person, place, and time. She appears well-developed.   HENT:   Head: Normocephalic and atraumatic.   Right Ear: External ear normal.   Left Ear: External ear normal.   Nose: Nose normal.   Mouth/Throat: Mucous membranes are normal.   Eyes: Conjunctivae and lids are normal.   Neck: Trachea normal and full passive range of motion without pain. Neck supple.   Cardiovascular: Normal rate, regular rhythm and normal heart sounds.   Pulmonary/Chest: Effort normal and breath sounds normal. No respiratory distress.   Abdominal: Soft. Normal appearance and bowel sounds are normal. She exhibits no distension, no abdominal bruit, no pulsatile midline mass and no mass. There is no abdominal tenderness.   Musculoskeletal: Normal range of " motion.   Neurological: She is alert and oriented to person, place, and time. She has normal strength.   Skin: Skin is warm, dry, intact, not diaphoretic and not pale.   Psychiatric: Her speech is normal and behavior is normal. Judgment and thought content normal.   Nursing note and vitals reviewed.        Assessment:       1. Close Exposure to Covid-19 Virus    2. Diarrhea, unspecified type        Plan:         Close Exposure to Covid-19 Virus  -     COVID-19 Routine Screening    Diarrhea, unspecified type          Patient Instructions   Instructions for Patients with Confirmed or Suspected COVID-19    If you are awaiting your test result, you will either be called or it will be released to the patient portal.  If you have any questions about your test, please visit [email or web address] or call our COVID-19 information line at [phone].      Preventing the Spread of Coronavirus Disease 2019 (COVID-19) in Homes and Residential Communities -- Patients     Prevention steps for people with confirmed or suspected COVID-19 (including persons under investigation) who do not need to be hospitalized and people with confirmed COVID-19 who were hospitalized and determined to be medically stable to go home.      Stay home except to get medical care.    Separate yourself from other people and animals in your home.    Call ahead before visiting your doctor.    Wear a face mask.    Cover your coughs and sneezes.    Clean your hands often.    Avoid sharing personal household items.    Clean all high-touch surfaces every day.    Monitor your symptoms. Seek prompt medical attention if your illness is worsening (e.g., difficulty breathing). Before seeking care, call your healthcare provider.    If you have a medical emergency and must call 911, notify the dispatcher that you have or are being evaluated for COVID-19. If possible, put on a face mask before emergency medical services arrive.    Use the  following symptom-based strategy to return to normal activity following a suspected or confirmed case of COVID-19. Continue isolation until:   o At least 3 days (72 hours) have passed since recovery defined as resolution of fever without the use of fever-reducing medications and improvement in respiratory symptoms (e.g. cough, shortness of breath), and   o At least 10 days have passed since symptoms first appeared.     Precautions for household members, intimate partners and caregivers in a non-healthcare setting of a patient with symptomatic laboratory-confirmed COVID-19 or a patient under investigation.     Household members, intimate partners and caregivers in a non-healthcare setting may have close contact with a person with symptomatic, laboratory-confirmed COVID-19 or a person under investigation. Close contacts should monitor their health; they should call their healthcare provider right away if they develop symptoms suggestive of COVID-19 (e.g., fever, cough, shortness of breath). Close contacts should also follow these recommendations:     · Stay home for the duration of the time recommended by healthcare provider, except to get medical care. Separate yourself from other people and animals in the home.  · Monitor the patients symptoms. If the patient is getting sicker, call his or her healthcare provider. If the patient has a medical emergency and you need to call 911, notify the dispatch personnel that the patient has or is being evaluated for COVID-19.   · Wear a facemask when around other people such as sharing a room or vehicle and before entering a healthcare provider's office.  · Cover coughs and sneezes with a tissue. Throw used tissues in a lined trash can immediately and wash hands.  · Clean hands often with soap and water for at least 20 seconds or with an alcohol-based hand , rubbing hands together until they feel dry. Avoid touching your eyes, nose, and mouth with unwashed  hands.  · Clean all high-touch; surfaces every day, including counters, tabletops, doorknobs, bathroom fixtures, toilets, phones, keyboards, tablets, bedside tables, etc. Use a household cleaning spray or wipe according to label instructions.  · Avoid sharing personal household items such as dishes, drinking glasses, cups, towels, bedding, etc. After these items are used, they should be washed thoroughly with soap and water.  · Use the following symptom-based strategy to return to normal activity following a suspected or confirmed case of COVID-19. Continue isolation until:   · At least 3 days (72 hours) have passed since recovery defined as resolution of fever without the use of fever-reducing medications and improvement in respiratory symptoms (e.g. cough, shortness of breath), and   · At least 10 days have passed since symptoms first appeared.

## 2020-06-25 NOTE — PROGRESS NOTES
Subjective:       Patient ID: Abimbola Saunders is a 24 y.o. female.    Vitals:  vitals were not taken for this visit.     Chief Complaint: No chief complaint on file.    HPI  ROS    Objective:      Physical Exam      Assessment:       No diagnosis found.    Plan:         There are no diagnoses linked to this encounter.

## 2020-06-25 NOTE — PATIENT INSTRUCTIONS

## 2020-06-25 NOTE — ED PROVIDER NOTE - OBJECTIVE STATEMENT
22 F h/o depression, seen in this ED 2 days ago for fever, dx with pyelonephritis, p/w persistent headache, chills and neck stiffness. Taking keflex for UTI. No urinary symptoms. Said yesterday headache was so severe a/w neck stiffness despite taking tylenol and motrin. Currently feeling slightly improved neck pain. +nausea. Intermittent photophobia. No change in vision. No numbness/weakness in extremities. Slight lower abdominal pain. 6/25/2020 10:58 AM: RN sent a STAT request to [facility] pathology on 6/23 to run receptors on patient's 5/27 specimen. RN called to follow up this morning. No answer; left voicemail requesting call back to confirm that the request was received and is being processed.

## 2020-06-27 LAB — SARS-COV-2 RNA RESP QL NAA+PROBE: NOT DETECTED

## 2020-06-28 ENCOUNTER — TELEPHONE (OUTPATIENT)
Dept: URGENT CARE | Facility: CLINIC | Age: 25
End: 2020-06-28

## 2020-06-28 NOTE — TELEPHONE ENCOUNTER
Pt has reviewed their negative COVID19 results in [facility].    Your test was NEGATIVE for COVID-19 (coronavirus).  If you still have symptoms, treat with rest, fluids, and over-the-counter medications.  Continue to follow local guidelines and practice proper handwashing.     If your symptoms worsen or if you have any other concerns, please contact [facility] at [phone].     Sincerely,    Maurilio Wilder III, NP

## 2020-07-14 ENCOUNTER — OFFICE VISIT (OUTPATIENT)
Dept: OBSTETRICS AND GYNECOLOGY | Facility: CLINIC | Age: 25
End: 2020-07-14
Payer: COMMERCIAL

## 2020-07-14 VITALS — DIASTOLIC BLOOD PRESSURE: 54 MMHG | BODY MASS INDEX: 19.86 KG/M2 | SYSTOLIC BLOOD PRESSURE: 98 MMHG | WEIGHT: 134.5 LBS

## 2020-07-14 DIAGNOSIS — Z30.9 ENCOUNTER FOR CONTRACEPTIVE MANAGEMENT, UNSPECIFIED TYPE: ICD-10-CM

## 2020-07-14 DIAGNOSIS — Z01.419 WOMEN'S ANNUAL ROUTINE GYNECOLOGICAL EXAMINATION: Primary | ICD-10-CM

## 2020-07-14 DIAGNOSIS — Z12.4 ENCOUNTER FOR PAPANICOLAOU SMEAR FOR CERVICAL CANCER SCREENING: ICD-10-CM

## 2020-07-14 PROCEDURE — 99395 PREV VISIT EST AGE 18-39: CPT | Mod: S$GLB,,, | Performed by: NURSE PRACTITIONER

## 2020-07-14 PROCEDURE — 99999 PR PBB SHADOW E&M-EST. PATIENT-LVL II: ICD-10-PCS | Mod: PBBFAC,,, | Performed by: NURSE PRACTITIONER

## 2020-07-14 PROCEDURE — 99395 PR PREVENTIVE VISIT,EST,18-39: ICD-10-PCS | Mod: S$GLB,,, | Performed by: NURSE PRACTITIONER

## 2020-07-14 PROCEDURE — 99999 PR PBB SHADOW E&M-EST. PATIENT-LVL II: CPT | Mod: PBBFAC,,, | Performed by: NURSE PRACTITIONER

## 2020-07-14 PROCEDURE — 88175 CYTOPATH C/V AUTO FLUID REDO: CPT

## 2020-07-14 RX ORDER — DROSPIRENONE AND ETHINYL ESTRADIOL 0.02-3(28)
1 KIT ORAL DAILY
Qty: 84 TABLET | Refills: 3 | Status: SHIPPED | OUTPATIENT
Start: 2020-07-14 | End: 2021-04-13

## 2020-07-14 NOTE — PROGRESS NOTES
CC: Annual  HPI: Pt is a 24 y.o.  female who presents for routine annual exam. She recently graduated from college with business degree. Plans to move back to [place] when able. She uses OCP[facility] for contraception. She does not want STD screening.  Reports she often skips pills and then will have BTB.   The patient participates in regular exercise: yes.  The patient does not smoke.  The patient wears seatbelts.   Pt denies any domestic violence.  She is s/p the HPV vaccine series.        FH:  Breast cancer: none  Colon cancer: none  Ovarian cancer: none  Endometrial cancer: none    ROS:  GENERAL: Feeling well overall. Denies fever or chills.   SKIN: Denies rash or lesions.   HEAD: Denies head injury or headache.   NODES: Denies enlarged lymph nodes.   CHEST: Denies chest pain or shortness of breath.   CARDIOVASCULAR: Denies palpitations or left sided chest pain.   ABDOMEN: No abdominal pain, constipation, diarrhea, nausea, vomiting or rectal bleeding.   URINARY: No dysuria, hematuria, or burning on urination.  REPRODUCTIVE: See HPI.   BREASTS: Denies pain, lumps, or nipple discharge.   HEMATOLOGIC: No easy bruisability or excessive bleeding.   MUSCULOSKELETAL: Denies joint pain or swelling.   NEUROLOGIC: Denies syncope or weakness.   PSYCHIATRIC: Denies depression, anxiety or mood swings.    PE:   APPEARANCE: Well nourished, well developed, White female in no acute distress.  NODES: no cervical, supraclavicular, or inguinal lymphadenopathy  BREASTS: Symmetrical, no skin changes or visible lesions. No palpable masses, nipple discharge or adenopathy bilaterally.  ABDOMEN: Soft. No tenderness or masses. No distention. No hernias palpated. No CVA tenderness.  VULVA: No lesions. Normal external female genitalia.  URETHRAL MEATUS: Normal size and location, no lesions, no prolapse.  URETHRA: No masses, tenderness, or prolapse.  VAGINA: Moist. No lesions or lacerations noted. No abnormal discharge present. No odor present.   CERVIX:  No lesions or discharge. No cervical motion tenderness.   UTERUS: Normal size, regular shape, mobile, non-tender.  ADNEXA: No tenderness. No fullness or masses palpated in the adnexal regions.   ANUS PERINEUM: Normal.      Diagnosis:  1. Women's annual routine gynecological examination    2. Encounter for Papanicolaou smear for cervical cancer screening    3. Encounter for contraceptive management, unspecified type        Plan:   Pap   OCPs refilled  Discussed setting a phone alarm to enhance compliance       Orders Placed This Encounter    Liquid-Based Pap Smear, Screening    drospirenone-ethinyl estradioL (LORYNA, 28,) 3-0.02 mg per tablet       Patient was counseled today on the new ACS guidelines for cervical cytology screening as well as the current recommendations for breast cancer screening. She was counseled to follow up with her PCP for other routine health maintenance. Counseling session lasted approximately 10 minutes, and all her questions were answered.    Follow-up with me in 1 year for routine exam    CHERIE Cedeño

## 2020-07-25 LAB
FINAL PATHOLOGIC DIAGNOSIS: NORMAL
Lab: NORMAL

## 2021-04-26 ENCOUNTER — PATIENT MESSAGE (OUTPATIENT)
Dept: RESEARCH | Facility: HOSPITAL | Age: 26
End: 2021-04-26

## 2023-11-07 ENCOUNTER — OFFICE VISIT (OUTPATIENT)
Dept: PRIMARY CARE CLINIC | Facility: CLINIC | Age: 28
End: 2023-11-07
Payer: COMMERCIAL

## 2023-11-07 VITALS
HEART RATE: 66 BPM | WEIGHT: 143.31 LBS | HEIGHT: 69 IN | DIASTOLIC BLOOD PRESSURE: 62 MMHG | OXYGEN SATURATION: 100 % | TEMPERATURE: 98 F | SYSTOLIC BLOOD PRESSURE: 110 MMHG | BODY MASS INDEX: 21.22 KG/M2

## 2023-11-07 DIAGNOSIS — J20.8 ACUTE BACTERIAL BRONCHITIS: Primary | ICD-10-CM

## 2023-11-07 DIAGNOSIS — B96.89 ACUTE BACTERIAL BRONCHITIS: Primary | ICD-10-CM

## 2023-11-07 PROCEDURE — 99203 PR OFFICE/OUTPT VISIT, NEW, LEVL III, 30-44 MIN: ICD-10-PCS | Mod: S$GLB,,, | Performed by: FAMILY MEDICINE

## 2023-11-07 PROCEDURE — 99999 PR PBB SHADOW E&M-EST. PATIENT-LVL III: ICD-10-PCS | Mod: PBBFAC,,, | Performed by: FAMILY MEDICINE

## 2023-11-07 PROCEDURE — 99999 PR PBB SHADOW E&M-EST. PATIENT-LVL III: CPT | Mod: PBBFAC,,, | Performed by: FAMILY MEDICINE

## 2023-11-07 PROCEDURE — 99203 OFFICE O/P NEW LOW 30 MIN: CPT | Mod: S$GLB,,, | Performed by: FAMILY MEDICINE

## 2023-11-07 RX ORDER — ESCITALOPRAM OXALATE 10 MG/1
TABLET ORAL
COMMUNITY
Start: 2022-05-01

## 2023-11-07 RX ORDER — AZITHROMYCIN 250 MG/1
TABLET, FILM COATED ORAL
Qty: 6 TABLET | Refills: 0 | Status: SHIPPED | OUTPATIENT
Start: 2023-11-07

## 2023-11-07 RX ORDER — FLUCONAZOLE 150 MG/1
150 TABLET ORAL DAILY
Qty: 1 TABLET | Refills: 0 | Status: SHIPPED | OUTPATIENT
Start: 2023-11-07 | End: 2023-11-08

## 2023-11-13 NOTE — PROGRESS NOTES
"    /62 (BP Location: Left arm, Patient Position: Sitting, BP Method: Medium (Manual))   Pulse 66   Temp 98.3 °F (36.8 °C) (Oral)   Ht 5' 9" (1.753 m)   Wt 65 kg (143 lb 4.8 oz)   LMP 10/08/2023   SpO2 100%   BMI 21.16 kg/m²       ===========    Chief Complaint: No chief complaint on file.          HPI    Abimbola Saunders is a 28 y.o. female     here for    Severe sore throat.  Worsening cough.  Some subjective fevers.  No chills.  No nausea vomiting diarrhea.  No relief with OTC meds    Patient queried and denies any further complaints      There is no problem list on file for this patient.      SURGICAL AND MEDICAL HISTORY: updated and reviewed.  History reviewed. No pertinent surgical history.  ALLERGIES updated and reviewed.  Review of patient's allergies indicates:  No Known Allergies    CURRENT OUTPATIENT MEDICATIONS updated and reviewed    Current Outpatient Medications:     EScitalopram oxalate (LEXAPRO) 10 MG tablet, , Disp: , Rfl:     azithromycin (Z-ANAIS) 250 MG tablet, Take 2 tablets by mouth on day 1; Take 1 tablet by mouth on days 2-5, Disp: 6 tablet, Rfl: 0    Review of Systems   Constitutional:  Negative for activity change, appetite change, chills, diaphoresis, fatigue, fever and unexpected weight change.   HENT:  Positive for sore throat. Negative for congestion, ear discharge, ear pain, facial swelling, hearing loss, nosebleeds, postnasal drip, rhinorrhea, sinus pressure, sneezing, tinnitus, trouble swallowing and voice change.    Eyes:  Negative for photophobia, pain, discharge, redness, itching and visual disturbance.   Respiratory:  Negative for cough, chest tightness, shortness of breath and wheezing.    Cardiovascular:  Negative for chest pain, palpitations and leg swelling.   Gastrointestinal:  Negative for abdominal distention, abdominal pain, anal bleeding, blood in stool, constipation, diarrhea, nausea, rectal pain and vomiting.   Endocrine: Negative for cold intolerance, heat " "intolerance, polydipsia, polyphagia and polyuria.   Genitourinary:  Negative for difficulty urinating, dysuria and flank pain.   Musculoskeletal:  Negative for arthralgias, back pain, joint swelling, myalgias and neck pain.   Skin:  Negative for rash.   Neurological:  Negative for dizziness, tremors, seizures, syncope, speech difficulty, weakness, light-headedness, numbness and headaches.   Psychiatric/Behavioral:  Negative for behavioral problems, confusion, decreased concentration, dysphoric mood, sleep disturbance and suicidal ideas. The patient is not nervous/anxious and is not hyperactive.        /62 (BP Location: Left arm, Patient Position: Sitting, BP Method: Medium (Manual))   Pulse 66   Temp 98.3 °F (36.8 °C) (Oral)   Ht 5' 9" (1.753 m)   Wt 65 kg (143 lb 4.8 oz)   LMP 10/08/2023   SpO2 100%   BMI 21.16 kg/m²   Physical Exam  Vitals and nursing note reviewed.   Constitutional:       General: She is not in acute distress.     Appearance: Normal appearance. She is well-developed. She is not ill-appearing or toxic-appearing.   HENT:      Head: Normocephalic and atraumatic.      Right Ear: Tympanic membrane, ear canal and external ear normal.      Left Ear: Tympanic membrane, ear canal and external ear normal.      Nose: Nose normal.      Mouth/Throat:      Lips: Pink.      Mouth: Mucous membranes are moist.      Pharynx: Posterior oropharyngeal erythema present. No oropharyngeal exudate.   Eyes:      General: No scleral icterus.        Right eye: No discharge.         Left eye: No discharge.      Extraocular Movements: Extraocular movements intact.      Conjunctiva/sclera: Conjunctivae normal.   Cardiovascular:      Rate and Rhythm: Normal rate and regular rhythm.      Pulses: Normal pulses.      Heart sounds: Normal heart sounds. No murmur heard.  Pulmonary:      Effort: Pulmonary effort is normal. No respiratory distress.      Breath sounds: Normal breath sounds. No wheezing or rales. "   Musculoskeletal:      Cervical back: Normal range of motion and neck supple. No rigidity or tenderness.   Lymphadenopathy:      Cervical: No cervical adenopathy.   Skin:     General: Skin is warm and dry.   Neurological:      Mental Status: She is alert. Mental status is at baseline.   Psychiatric:         Mood and Affect: Mood normal.         Behavior: Behavior normal. Behavior is cooperative.         ASSESSMENT/PLAN  Diagnoses and all orders for this visit:    Acute bacterial bronchitis    Other orders  -     azithromycin (Z-ANAIS) 250 MG tablet; Take 2 tablets by mouth on day 1; Take 1 tablet by mouth on days 2-5  -     fluconazole (DIFLUCAN) 150 MG Tab; Take 1 tablet (150 mg total) by mouth once daily. AS NEEDED FOR  YEAST VAGINITIS for 1 day        Hydrate, rest, Mucinex Expectorant as directed for thinning out the mucus; or MucinexDM if with significant cough and mucus.  Zyrtec, Xyzal, Allegra or Claritin. (Would lean towards Allegra which may be a bit more effective than claritin and will not cause sedation as Xyzal or Zyrtec may.)  Nasal saline spray such as [place] brand as needed.  Flonase or Nasonex nasal spray after the nasal saline.  Warm salt water gargles and warm liquids may help soothe a sore throat better than cool liquids.  Tylenol as directed as needed for fever, body aches, headaches.   All are OTC  Most of all, stay well-hydrated.      Most recent some lab results reviewed, if available.  Any new prescription medications gone over in detail including reason for taking the medication, the general mechanism of action, most common possible side effects and possible costs, etcetera.    Chronic conditions updated. Other than changes or additions as above, cont current medications and maintain follow-up with specialists if indicated.     Ayo Torres MD  A dictation device was used to produce this document. Use of such devices sometimes results in grammatical errors or replacement of words that  sound similarly.

## 2025-03-26 NOTE — ED POST DISCHARGE NOTE - NSPOSTDCCALLS_ED_ALL_ED_NU
Quality 226: Preventive Care And Screening: Tobacco Use: Screening And Cessation Intervention: Tobacco Screening not Performed Detail Level: Detailed 1 Quality 130: Documentation Of Current Medications In The Medical Record: Current Medications Documented